# Patient Record
Sex: FEMALE | Race: WHITE
[De-identification: names, ages, dates, MRNs, and addresses within clinical notes are randomized per-mention and may not be internally consistent; named-entity substitution may affect disease eponyms.]

---

## 2012-12-27 VITALS — DIASTOLIC BLOOD PRESSURE: 68 MMHG | SYSTOLIC BLOOD PRESSURE: 119 MMHG

## 2018-03-04 ENCOUNTER — HOSPITAL ENCOUNTER (INPATIENT)
Dept: HOSPITAL 33 - ED | Age: 73
LOS: 1 days | Discharge: HOME | DRG: 391 | End: 2018-03-05
Attending: GENERAL PRACTICE | Admitting: FAMILY MEDICINE
Payer: MEDICARE

## 2018-03-04 DIAGNOSIS — N39.0: ICD-10-CM

## 2018-03-04 DIAGNOSIS — J84.10: ICD-10-CM

## 2018-03-04 DIAGNOSIS — A41.9: ICD-10-CM

## 2018-03-04 DIAGNOSIS — M19.90: ICD-10-CM

## 2018-03-04 DIAGNOSIS — Z79.899: ICD-10-CM

## 2018-03-04 DIAGNOSIS — F41.9: ICD-10-CM

## 2018-03-04 DIAGNOSIS — K52.9: Primary | ICD-10-CM

## 2018-03-04 DIAGNOSIS — I10: ICD-10-CM

## 2018-03-04 LAB
ALBUMIN SERPL-MCNC: 3.8 G/DL (ref 3.4–5)
ALP SERPL-CCNC: 83 U/L (ref 46–116)
ALT SERPL-CCNC: 28 U/L (ref 12–78)
AMYLASE SERPL-CCNC: 52 U/L (ref 25–115)
ANION GAP SERPL CALC-SCNC: 15.9 MEQ/L (ref 5–15)
AST SERPL QL: 26 U/L (ref 15–37)
BASOPHILS # BLD AUTO: 0.02 10*3/UL (ref 0–0.4)
BASOPHILS NFR BLD AUTO: 0.1 % (ref 0–0.4)
BILIRUB BLD-MCNC: 0.5 MG/DL (ref 0.2–1)
BUN SERPL-MCNC: 15 MG/DL (ref 9–20)
CALCIUM SPEC-MCNC: 10.1 MG/DL (ref 8.5–10.1)
CHLORIDE SERPL-SCNC: 105 MEQ/L (ref 98–107)
CO2 SERPL-SCNC: 26.1 MEQ/L (ref 21–32)
CREAT SERPL-MCNC: 0.88 MG/DL (ref 0.55–1.3)
EOSINOPHIL # BLD AUTO: 0.04 10*3/UL (ref 0–0.5)
GLUCOSE SERPL-MCNC: 143 MG/DL (ref 70–110)
GLUCOSE UR-MCNC: NEGATIVE MG/DL
GRANULOCYTES # BLD AUTO: 15.54 10*3/UL (ref 1.4–6.9)
HCT VFR BLD AUTO: 40.9 % (ref 35–47)
HGB BLD-MCNC: 13.3 GM/DL (ref 12–16)
LIPASE SERPL-CCNC: 131 U/L (ref 73–393)
LYMPHOCYTES # SPEC AUTO: 1.46 10*3/UL (ref 1–4.6)
MCH RBC QN AUTO: 31.7 PG (ref 26–32)
MCHC RBC AUTO-ENTMCNC: 32.5 G/DL (ref 32–36)
MONOCYTES # BLD AUTO: 1.3 10*3/UL (ref 0–1.3)
NEUTROPHILS NFR BLD AUTO: 84.6 % (ref 36–66)
PLATELET # BLD AUTO: 201 K/MM3 (ref 150–450)
POTASSIUM SERPLBLD-SCNC: 4.4 MEQ/L (ref 3.5–5.1)
PROT SERPL-MCNC: 8 GM/DL (ref 6.4–8.2)
PROT UR STRIP-MCNC: NEGATIVE MG/DL
RBC # BLD AUTO: 4.2 M/MM3 (ref 4.1–5.4)
SODIUM SERPL-SCNC: 143 MEQ/L (ref 136–145)
WBC # BLD AUTO: 18.4 K/MM3 (ref 4–10.5)
WBC URNS QL MICRO: (no result) /HPF (ref 0–5)

## 2018-03-04 PROCEDURE — 85025 COMPLETE CBC W/AUTO DIFF WBC: CPT

## 2018-03-04 PROCEDURE — 96360 HYDRATION IV INFUSION INIT: CPT

## 2018-03-04 PROCEDURE — 36415 COLL VENOUS BLD VENIPUNCTURE: CPT

## 2018-03-04 PROCEDURE — 87086 URINE CULTURE/COLONY COUNT: CPT

## 2018-03-04 PROCEDURE — 87045 FECES CULTURE AEROBIC BACT: CPT

## 2018-03-04 PROCEDURE — 87177 OVA AND PARASITES SMEARS: CPT

## 2018-03-04 PROCEDURE — 74177 CT ABD & PELVIS W/CONTRAST: CPT

## 2018-03-04 PROCEDURE — 99285 EMERGENCY DEPT VISIT HI MDM: CPT

## 2018-03-04 PROCEDURE — 87209 SMEAR COMPLEX STAIN: CPT

## 2018-03-04 PROCEDURE — 80048 BASIC METABOLIC PNL TOTAL CA: CPT

## 2018-03-04 PROCEDURE — 87040 BLOOD CULTURE FOR BACTERIA: CPT

## 2018-03-04 PROCEDURE — 36000 PLACE NEEDLE IN VEIN: CPT

## 2018-03-04 PROCEDURE — 80053 COMPREHEN METABOLIC PANEL: CPT

## 2018-03-04 PROCEDURE — 87077 CULTURE AEROBIC IDENTIFY: CPT

## 2018-03-04 PROCEDURE — 96365 THER/PROPH/DIAG IV INF INIT: CPT

## 2018-03-04 PROCEDURE — 87186 SC STD MICRODIL/AGAR DIL: CPT

## 2018-03-04 PROCEDURE — 87046 STOOL CULTR AEROBIC BACT EA: CPT

## 2018-03-04 PROCEDURE — 87493 C DIFF AMPLIFIED PROBE: CPT

## 2018-03-04 PROCEDURE — 83605 ASSAY OF LACTIC ACID: CPT

## 2018-03-04 PROCEDURE — 81000 URINALYSIS NONAUTO W/SCOPE: CPT

## 2018-03-04 PROCEDURE — 87335 E COLI 0157 AG IA: CPT

## 2018-03-04 PROCEDURE — 83690 ASSAY OF LIPASE: CPT

## 2018-03-04 PROCEDURE — 96374 THER/PROPH/DIAG INJ IV PUSH: CPT

## 2018-03-04 PROCEDURE — 82150 ASSAY OF AMYLASE: CPT

## 2018-03-04 RX ADMIN — METRONIDAZOLE SCH MLS/HR: 500 INJECTION, SOLUTION INTRAVENOUS at 23:56

## 2018-03-04 RX ADMIN — DULOXETINE HYDROCHLORIDE SCH MG: 30 CAPSULE, DELAYED RELEASE ORAL at 17:23

## 2018-03-04 RX ADMIN — MORPHINE SULFATE PRN MG: 2 INJECTION, SOLUTION INTRAMUSCULAR; INTRAVENOUS at 14:43

## 2018-03-04 RX ADMIN — ACETAMINOPHEN PRN MG: 325 TABLET ORAL at 14:52

## 2018-03-04 RX ADMIN — MORPHINE SULFATE PRN MG: 2 INJECTION, SOLUTION INTRAMUSCULAR; INTRAVENOUS at 21:05

## 2018-03-04 RX ADMIN — PANTOPRAZOLE SODIUM SCH MG: 40 INJECTION, POWDER, FOR SOLUTION INTRAVENOUS at 14:39

## 2018-03-04 RX ADMIN — HYDROCHLOROTHIAZIDE SCH MG: 25 TABLET ORAL at 14:39

## 2018-03-04 RX ADMIN — ACETAMINOPHEN PRN MG: 325 TABLET ORAL at 20:07

## 2018-03-04 RX ADMIN — METRONIDAZOLE SCH MLS/HR: 500 INJECTION, SOLUTION INTRAVENOUS at 17:23

## 2018-03-04 NOTE — XRAY
Indication: Left lower quadrant pain, nausea, vomiting, and diarrhea.



Multiple contiguous axial images obtained through the abdomen and pelvis using

80 cc of Isovue-370 contrast only.



Comparison: None



Lung bases demonstrate bibasilar atelectasis/scarring.  No infiltrate or

effusion.  Heart is not enlarged.  Small hiatal hernia.



Noncontrasted stomach and small bowel loops appear nonobstructed.  Moderate

diffuse scattered colonic fecal debris throughout.  Splenic flexure and

descending colon demonstrate mild bowel wall thickening and stranding favoring

colitis.  Tiny left colic free fluid.  No walled off fluid collection or free

air.  Minimal sigmoid diverticulosis.  Previous reported appendectomy and

hysterectomy.



A few tiny gallstones, largest 10 mm.  No abnormal biliary distention.

Remaining liver, pancreas, spleen, adrenal glands, kidneys, ureters, and

bladder appear unremarkable.  Mild aortoiliac calcifications.  No AAA or

pathologic retroperitoneal lymphadenopathy.  Several prominent left upper

quadrant collateral vessels without splenomegaly, cirrhosis, or evidence for

portal hypertension.



Osseous structures intact with mild/moderate multilevel degenerative

spondylosis and mild dextrorotoscoliosis centered at L3 level.  Also mild

bilateral hip degenerative arthropathy.



Impression:

1.  CT findings as detailed favoring colitis.  Tiny left colic free fluid

presumed reactive.  Fecal stasis without obstruction and sigmoid

diverticulosis.

2.  Left upper quadrant prominent vessels possibly portosystemic collaterals.

3.  Small hiatal hernia and gallstones.



Comment: Preliminary interpretation was made by [facility].  No critical discrepancy.



CTDI 23.68
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
## 2018-03-05 VITALS — SYSTOLIC BLOOD PRESSURE: 98 MMHG | DIASTOLIC BLOOD PRESSURE: 58 MMHG

## 2018-03-05 VITALS — OXYGEN SATURATION: 94 % | HEART RATE: 74 BPM

## 2018-03-05 LAB
ANION GAP SERPL CALC-SCNC: 16.6 MEQ/L (ref 5–15)
BUN SERPL-MCNC: 39 MG/DL (ref 9–20)
C DIFF DNA SPEC QL NAA+PROBE: (no result)
C DIFF TOX B STL QL: NEGATIVE
CALCIUM SPEC-MCNC: 8.2 MG/DL (ref 8.5–10.1)
CELLS COUNTED: 100
CHLORIDE SERPL-SCNC: 106 MEQ/L (ref 98–107)
CO2 SERPL-SCNC: 19.2 MEQ/L (ref 21–32)
CREAT SERPL-MCNC: 1.56 MG/DL (ref 0.55–1.3)
GLUCOSE SERPL-MCNC: 139 MG/DL (ref 70–110)
GRANULOCYTES # BLD: 11.7 10*3/UL (ref 1.4–6.9)
HCT VFR BLD AUTO: 39.1 % (ref 35–47)
HGB BLD-MCNC: 12.8 GM/DL (ref 12–16)
MANUAL DIF COMMENT BLD-IMP: NORMAL
MCH RBC QN AUTO: 31.7 PG (ref 26–32)
MCHC RBC AUTO-ENTMCNC: 32.7 G/DL (ref 32–36)
NEUTS BAND # BLD MANUAL: 16 % (ref 0–2)
PLATELET # BLD AUTO: 227 K/MM3 (ref 150–450)
POTASSIUM SERPLBLD-SCNC: 3.5 MEQ/L (ref 3.5–5.1)
RBC # BLD AUTO: 4.03 M/MM3 (ref 4.1–5.4)
SODIUM SERPL-SCNC: 138 MEQ/L (ref 136–145)
WBC # BLD AUTO: 18.3 K/MM3 (ref 4–10.5)

## 2018-03-05 RX ADMIN — DEXTROSE AND SODIUM CHLORIDE SCH MLS/HR: 5; 450 INJECTION, SOLUTION INTRAVENOUS at 12:13

## 2018-03-05 RX ADMIN — FENTANYL CITRATE PRN MCG: 50 INJECTION, SOLUTION INTRAMUSCULAR; INTRAVENOUS at 13:33

## 2018-03-05 RX ADMIN — ONDANSETRON PRN MG: 2 INJECTION, SOLUTION INTRAMUSCULAR; INTRAVENOUS at 08:05

## 2018-03-05 RX ADMIN — DULOXETINE HYDROCHLORIDE SCH MG: 30 CAPSULE, DELAYED RELEASE ORAL at 10:43

## 2018-03-05 RX ADMIN — PANTOPRAZOLE SODIUM SCH MG: 40 INJECTION, POWDER, FOR SOLUTION INTRAVENOUS at 10:44

## 2018-03-05 RX ADMIN — ACETAMINOPHEN PRN MG: 325 TABLET ORAL at 00:00

## 2018-03-05 RX ADMIN — DEXTROSE AND SODIUM CHLORIDE SCH MLS/HR: 5; 450 INJECTION, SOLUTION INTRAVENOUS at 16:07

## 2018-03-05 RX ADMIN — ACETAMINOPHEN PRN MG: 325 TABLET ORAL at 06:45

## 2018-03-05 RX ADMIN — ONDANSETRON PRN MG: 2 INJECTION, SOLUTION INTRAMUSCULAR; INTRAVENOUS at 14:44

## 2018-03-05 RX ADMIN — METRONIDAZOLE SCH MLS/HR: 500 INJECTION, SOLUTION INTRAVENOUS at 05:56

## 2018-03-05 RX ADMIN — ACETAMINOPHEN PRN MG: 325 TABLET ORAL at 15:46

## 2018-03-05 RX ADMIN — METRONIDAZOLE SCH MLS/HR: 500 INJECTION, SOLUTION INTRAVENOUS at 11:58

## 2018-03-05 RX ADMIN — FENTANYL CITRATE PRN MCG: 50 INJECTION, SOLUTION INTRAMUSCULAR; INTRAVENOUS at 15:51

## 2018-03-05 RX ADMIN — HYDROCHLOROTHIAZIDE SCH: 25 TABLET ORAL at 10:49

## 2018-03-05 NOTE — HP
HISTORY OF PRESENT ILLNESS:  This is a 72 year-old who presented to the emergency 
department yesterday. She reports she started feeling bad yesterday morning with nausea, 
vomiting and diarrhea along with left lower quadrant pain. She reports she has had 
decreased appetite. She denies eating out anywhere, traveling anywhere. She uses city 
water and has not been on any antibiotics recently. She denies having any problems like 
this previously. She reports colonoscopy three years ago at [facility] by Dr. Alvarez. She reports that she has continued to have left lower quadrant 
pain and pain medicine helped some that was being given to her but not quite enough. In 
the emergency department she had CT scan of her abdomen and pelvis that was concerning for 
colitis and she was started on metronidazole and levofloxacin. 



REVIEW OF SYSTEMS:  She denies any cough. No rhinorrhea. No dysuria. No hematuria. She had 
a decreased appetite. Otherwise review of systems is negative. 



PAST MEDICAL HISTORY:  Anxiety disorder, hypertension, pneumonia in  for which she was 
at [facility]. Osteoarthritis in her lower back and history of mild 
pulmonary fibrosis diagnosed by Dr. Jose Alberto Pritchard. 



PAST SURGICAL HISTORY: Hysterectomy, right knee replacement. 



MEDICATIONS:  Duloxetine 20 mg p.o. daily, lisinopril hydrochlorothiazide 1 tablet p.o. 
daily, meloxicam 15 mg p.o. daily. 



ALLERGIES:  CODEINE.  



SOCIAL HISTORY:  Her grandson lives at home with her. She denies any alcohol use. She used 
to smoke but quit years ago. 



FAMILY HISTORY:  Her mother is living and has asthma, history of CVA and heart disease. 
Her father is  and had arthritis. Her brother is  and  from urinary 
tract infection. 



PHYSICAL EXAMINATION: 

VITAL SIGNS:  Temperature current 99F, temperature max 101.8F, heart rate 74 to 104 
currently 74, respiratory rate 16 to 20 currently 18, blood pressure 90 to 151 over 50 to 
79.   Oxygen saturation 94% on 2 liters nasal cannula.  

GENERAL: The patient is lying in bed in no acute distress. Her son is at the bedside.  

CVS: Her heart has a regular rate and rhythm. No murmurs, gallops or rubs. 

CHEST: Clear to auscultation bilaterally. No crackles or wheezes. 

ABDOMEN:  She has tenderness in her left lower and left upper quadrant. No guarding. No 
rigidity. Hyperactive bowel sounds. 

EXTREMITIES: No clubbing, cyanosis or edema. 

SKIN: Warm, dry and intact. 

 

LABORATORY DATA AND TESTS: On admission her white blood cell count was 18,400 with 84% 
granulocytes. Repeat this morning 18,300 with 16% bands, 47% neutrophils. CMP on admission 
glucose 143. Repeat BMP carbon dioxide 19.2, creatinine 1.56. UA with moderate bacteria. 
She has one blood culture in lab, stool culture pending. She had a UA gram negative with 
I&D and sensitivity pending. 



ASSESSMENT AND PLAN:  

1) COLITIS: She was started on Levaquin and metronidazole in the emergency department. She 
continued to have diarrhea and her white blood cell count has remained the same over the 
past 24 hours. I am going to add Zosyn to her antibiotics. 

2) SEPSIS: Will plan to place her on sepsis protocol. I have already ordered 500 ml normal 
saline bolus and lactic acid. Her renal function is worse than when she came in and 
continue to monitor ins and outs closely. Her blood pressure medicine was held.  She has 
blood culture and urine culture in lab.

3) URINARY TRACT INFECTION: She is growing gram-negative organism in urine culture this 
should be covered with Levaquin and Zosyn. 

4) DEEP VENOUS THROMBOSIS PROPHYLAXIS: She has been started on Lovenox and ANDRESSA hose.

## 2018-03-18 ENCOUNTER — HOSPITAL ENCOUNTER (EMERGENCY)
Dept: HOSPITAL 33 - ED | Age: 73
End: 2018-03-18
Payer: MEDICARE

## 2018-03-18 DIAGNOSIS — Z53.21: Primary | ICD-10-CM

## 2020-03-06 ENCOUNTER — HOSPITAL ENCOUNTER (EMERGENCY)
Dept: HOSPITAL 33 - ED | Age: 75
Discharge: HOME | End: 2020-03-06
Payer: MEDICARE

## 2020-03-06 VITALS — DIASTOLIC BLOOD PRESSURE: 44 MMHG | OXYGEN SATURATION: 97 % | HEART RATE: 70 BPM | SYSTOLIC BLOOD PRESSURE: 99 MMHG

## 2020-03-06 DIAGNOSIS — J09.X2: Primary | ICD-10-CM

## 2020-03-06 DIAGNOSIS — J40: ICD-10-CM

## 2020-03-06 DIAGNOSIS — M06.9: ICD-10-CM

## 2020-03-06 DIAGNOSIS — I10: ICD-10-CM

## 2020-03-06 DIAGNOSIS — F41.9: ICD-10-CM

## 2020-03-06 DIAGNOSIS — Z79.899: ICD-10-CM

## 2020-03-06 LAB
ALBUMIN SERPL-MCNC: 4.2 G/DL (ref 3.5–5)
ALP SERPL-CCNC: 77 U/L (ref 38–126)
ALT SERPL-CCNC: 13 U/L (ref 0–35)
ANION GAP SERPL CALC-SCNC: 12.9 MEQ/L (ref 5–15)
AST SERPL QL: 26 U/L (ref 14–36)
BILIRUB BLD-MCNC: 0.3 MG/DL (ref 0.2–1.3)
BUN SERPL-MCNC: 18 MG/DL (ref 7–17)
CALCIUM SPEC-MCNC: 9.4 MG/DL (ref 8.4–10.2)
CELLS COUNTED: 100
CHLORIDE SERPL-SCNC: 106 MMOL/L (ref 98–107)
CO2 SERPL-SCNC: 27 MMOL/L (ref 22–30)
CREAT SERPL-MCNC: 1.1 MG/DL (ref 0.52–1.04)
GLUCOSE SERPL-MCNC: 115 MG/DL (ref 74–106)
GLUCOSE UR-MCNC: NEGATIVE MG/DL
HCT VFR BLD AUTO: 37.1 % (ref 35–47)
HGB BLD-MCNC: 12.3 GM/DL (ref 12–16)
MANUAL DIF COMMENT BLD-IMP: NORMAL
MCH RBC QN AUTO: 32 PG (ref 26–32)
MCHC RBC AUTO-ENTMCNC: 33.2 G/DL (ref 32–36)
NEUTS BAND # BLD MANUAL: 2 % (ref 0–2)
PLATELET # BLD AUTO: 178 K/MM3 (ref 150–450)
POTASSIUM SERPLBLD-SCNC: 3.9 MMOL/L (ref 3.5–5.1)
PROT SERPL-MCNC: 7.9 G/DL (ref 6.3–8.2)
PROT UR STRIP-MCNC: NEGATIVE MG/DL
RBC # BLD AUTO: 3.84 M/MM3 (ref 4.1–5.4)
RBC #/AREA URNS HPF: (no result) /HPF (ref 0–2)
SODIUM SERPL-SCNC: 142 MMOL/L (ref 137–145)
WBC # BLD AUTO: 5.1 K/MM3 (ref 4–10.5)
WBC #/AREA URNS HPF: (no result) /HPF (ref 0–5)

## 2020-03-06 PROCEDURE — 85025 COMPLETE CBC W/AUTO DIFF WBC: CPT

## 2020-03-06 PROCEDURE — 80053 COMPREHEN METABOLIC PANEL: CPT

## 2020-03-06 PROCEDURE — 93005 ELECTROCARDIOGRAM TRACING: CPT

## 2020-03-06 PROCEDURE — 81001 URINALYSIS AUTO W/SCOPE: CPT

## 2020-03-06 PROCEDURE — 94640 AIRWAY INHALATION TREATMENT: CPT

## 2020-03-06 PROCEDURE — 71046 X-RAY EXAM CHEST 2 VIEWS: CPT

## 2020-03-06 PROCEDURE — 36415 COLL VENOUS BLD VENIPUNCTURE: CPT

## 2020-03-06 PROCEDURE — 99284 EMERGENCY DEPT VISIT MOD MDM: CPT

## 2020-03-06 PROCEDURE — 84484 ASSAY OF TROPONIN QUANT: CPT

## 2020-03-06 PROCEDURE — 83605 ASSAY OF LACTIC ACID: CPT

## 2020-03-06 PROCEDURE — 94150 VITAL CAPACITY TEST: CPT
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
## 2020-03-06 NOTE — XRAY
Indication: Cough and congestion.



Comparison: December 31, 2018.



PA/lateral chest again hyperinflated with chronic lung markings.  No focal

infiltrate, consolidation, or large effusion.  Heart is not enlarged.  Bony

thorax intact again with mild osteopenia, degenerative changes, and double

curvature scoliosis.



Impression: Continued nonacute hyperinflated chest with chronic features.

## 2020-11-17 ENCOUNTER — HOSPITAL ENCOUNTER (OUTPATIENT)
Dept: HOSPITAL 33 - SDC | Age: 75
Discharge: HOME | End: 2020-11-17
Attending: OPHTHALMOLOGY
Payer: MEDICARE

## 2020-11-17 VITALS — DIASTOLIC BLOOD PRESSURE: 76 MMHG | SYSTOLIC BLOOD PRESSURE: 138 MMHG | HEART RATE: 88 BPM | OXYGEN SATURATION: 96 %

## 2020-11-17 DIAGNOSIS — Z79.899: ICD-10-CM

## 2020-11-17 DIAGNOSIS — F41.9: ICD-10-CM

## 2020-11-17 DIAGNOSIS — H25.812: Primary | ICD-10-CM

## 2020-11-17 DIAGNOSIS — K57.92: ICD-10-CM

## 2020-11-17 DIAGNOSIS — I10: ICD-10-CM

## 2020-11-17 DIAGNOSIS — J84.10: ICD-10-CM

## 2020-11-17 RX ADMIN — TETRACAINE HYDROCHLORIDE ONE ML: 5 SOLUTION OPHTHALMIC at 11:01

## 2020-11-17 RX ADMIN — TETRACAINE HYDROCHLORIDE ONE ML: 5 SOLUTION OPHTHALMIC at 10:20

## 2020-11-18 NOTE — OP
DATE/TIME OF OPERATION:  11/17/2020  1140

TIME DICTATED:   1321

PREOPERATIVE DIAGNOSIS:   Senile cataract of left eye. 

POSTOPERATIVE DIAGNOSIS: Senile cataract of left eye. 

SURGEON: Sheela Howell MD

ASSISTANT: None.

OPERATION: Cataract extraction of left eye with an intraocular lens implant. 

STANDARD __X__ COMPLEX______

ANESTHESIA: MAC.

___X___  Monitored anesthesia care in combination with topical and intra-cameral 
anesthesia (because of the established specific risk of reflux, arrhythmias, or an anxiety 
attack associated with ocular manipulation as well as difficulty of the ophthalmologist to 
manage such potentially catastrophic events while simultaneously attempting to complete 
the surgical procedure, it was deemed necessary for the patient's safety to have an 
anesthesiologist or a nurse anesthetist present during the procedure whenever possible. 
The anesthesiologist or the nurse anesthetist was utilized to monitor and regulate the 
intravenous sedation of the patient, so the patient was cooperative, relaxed, and 
comfortable).

______ Topical anesthesia using Tetracaine eye drops together with intra cameral 
anesthesia using Lidocaine 1% MPF. The nurse was utilized to monitor the patient. 

ANESTHESIA PROVIDER: Braydon Cha CRNA.

COMPLICATIONS: None.

BLOOD LOSS: None.

INDICATIONS: The patient is undergoing cataract surgery in the hopes of eliminating the 
visual complaints and difficulty. 

PROCEDURE: After arriving at the facility's outpatient surgery area, an IV was started; 
the patient was given 5 mg of p.o. Versed. (If an anesthesia provider was not monitoring 
the patient) The patient was then given topical anesthetic Tetracaine eye drops. A cotton 
pellet was soaked into a solution of a combination of Zymaxid 0.5%, Donis-Synephrine 2.5% 
and Ocufen (other drops might have been substituted referenced in the patient's record). 
The pellet was inserted by the RN into the lower conjunctival cul-de-sac with a sterile 
forceps and left for 20 minutes. The pellet was then removed by the RN with a sterile 
forceps before taking the patient to the operating room. The preoperative area nurse 
identified the patient and marked the correct eye to be operated on.

I identified the correct eye to be operated on and marked it appropriately in the 
outpatient surgery area.

The patient was then taken into the operating room. Tetracaine eye drops were installed 
again in the correct eye. The eyelids and the lashes and the lid margins were scrubbed 
with Betadine solution. One drop of the diluted Betadine solution was placed in the 
conjunctival cul-de-sac for 45 seconds and then was irrigated. A drop of Tetracaine Gel 
was placed in the conjunctival cul-de-sac. The patient's forehead was taped to secure it 
during the procedure. The patient was monitored. 

The patient was then draped in the usual way for this procedure. An eye speculum was used 
to separate the eyelids. The eye was then fixated and a temporal 2.5 mm incision was made 
in the clear cornea temporally at the limbus. Through the incision, 0.25 cc of 1% 
non-preserved lidocaine was injected into the anterior chamber for intracameral 
anesthesia. The anterior chamber was then filled with viscoelastic.

_____The pupil was small. I felt that it would be safer to mechanically dilate the 
pupil. A Malyugin ring was used at this point which dilated the pupil. That was removed 
at the end of the procedure prior to aspiration of the viscoelastic from the anterior 
chamber and posterior to the intraocular lens implant.

_____ The cataract had a great amount of cortical changes. That rendered seeing the 
anterior capsule difficult for a safe performance of an anterior capsulotomy. I injected 
an air bubble into the anterior chamber. I then injected 1 ML of vision blue solution into 
the anterior chamber. The vision blue solution was irrigated from the anterior chamber 
after 30 seconds. The anterior capsule was stained which facilitated performing the 
anterior capsulotomy safely. 

After that was completed, a cystotome was introduced into the anterior chamber and a 
round anterior capsulotomy was performed. The capsule was removed by a forceps.

Hydrodissection was next carried utilizing a 25-gauge cannula and balanced salt solution 
to delineate the cortical material from the capsule and the nucleus from the cortical 
material. The nucleus was rotated freely into the capsular bag with no difficulty.

The phaco tip of the [employer] Phacoemulsifier was introduced into the anterior 
chamber and two grooves were made into the nucleus 90 degrees apart. Using two spatulas 
resulted into the nucleus being fractured into four quadrants. The phaco tip was then used 
to remove each quadrant of the nucleus.

Viscoelastic was used during this process to protect the corneal endothelium. 

Once the entire nucleus was removed, the phaco tip then was removed and the irrigation tip 
was introduced into the eye and the cortex was removed. The posterior capsule was 
polished. 

______ It was noticed that there was a tear into the posterior capsule with few vitreous 
strands into the pupil plan. An anterior vitrectomy was performed.

A 23.00 diopter, [id number], posterior chamber lens implant, was inspected and found to be 
grossly normal. The implant was inserted into the implant injector cartridge; Viscoelastic 
again was introduced into the anterior chamber, which filled the capsular bag. The 
implant injector's cartridge tip was placed at the limbal wound and the posterior chamber 
implant was released into the capsular bag and rotated appropriately. The implant was 
found to be into the capsular bag and it was centered.

_____ 0.2 ml of Tri-Moxi was introduced via 27 gauge cannula into the vitreous cavity 
through the ciliary processes. 

Viscoelastic was aspirated from the anterior chamber and posterior to the intraocular lens 
implant from the capsular bag using the irrigating tip. 

The anterior chamber was irrigated and filled with 5 cc antibiotic solution (500 cc of BSS 
plus 2 ml of Fortaz 100 mg/ml) ( if patient was not allergic to the medication). The lips 
of the corneal incision were hydrated using BSS solution. The anterior chamber was checked 
and found to be water tight. 

___X__ One drop each of antibiotic, steroid and NSAID drops (refer to chart for drops 
used) were placed in the conjunctival cul-de-sac of the operated eye.

Patient tolerated the procedure quite well and left the operating room in satisfactory 
condition.

DISCHARGE SUMMARY: The patient was released in stable condition. The patient and those 
with the patient were given an instruction sheet as of how to care for the eye after 
surgery as well as counseling on any abnormal laboratory studies by the postoperative RN.

The patient was also given an appointment card for follow-up in the office and is to call 
immediately for any difficulties including but not limited to pain in the eye, decreased 
vision, discharge from the eye, headache and or fever. 

DISCHARGE DIAGNOSIS: Pseudophakia of left eye.

## 2020-12-15 ENCOUNTER — HOSPITAL ENCOUNTER (OUTPATIENT)
Dept: HOSPITAL 33 - SDC | Age: 75
Discharge: HOME | End: 2020-12-15
Attending: OPHTHALMOLOGY
Payer: MEDICARE

## 2020-12-15 VITALS — DIASTOLIC BLOOD PRESSURE: 61 MMHG | SYSTOLIC BLOOD PRESSURE: 135 MMHG | HEART RATE: 67 BPM

## 2020-12-15 VITALS — OXYGEN SATURATION: 97 %

## 2020-12-15 DIAGNOSIS — Z79.899: ICD-10-CM

## 2020-12-15 DIAGNOSIS — I10: ICD-10-CM

## 2020-12-15 DIAGNOSIS — H25.811: Primary | ICD-10-CM

## 2020-12-15 PROCEDURE — 99100 ANES PT EXTEME AGE<1 YR&>70: CPT
